# Patient Record
Sex: MALE | Race: WHITE | Employment: STUDENT | URBAN - METROPOLITAN AREA
[De-identification: names, ages, dates, MRNs, and addresses within clinical notes are randomized per-mention and may not be internally consistent; named-entity substitution may affect disease eponyms.]

---

## 2021-12-11 ENCOUNTER — APPOINTMENT (OUTPATIENT)
Dept: CT IMAGING | Facility: HOSPITAL | Age: 21
End: 2021-12-11
Attending: EMERGENCY MEDICINE

## 2021-12-11 ENCOUNTER — HOSPITAL ENCOUNTER (EMERGENCY)
Facility: HOSPITAL | Age: 21
Discharge: HOME | End: 2021-12-11
Attending: EMERGENCY MEDICINE

## 2021-12-11 VITALS
RESPIRATION RATE: 18 BRPM | OXYGEN SATURATION: 99 % | WEIGHT: 180 LBS | DIASTOLIC BLOOD PRESSURE: 71 MMHG | HEART RATE: 77 BPM | BODY MASS INDEX: 22.62 KG/M2 | TEMPERATURE: 99 F | HEIGHT: 74.8 IN | SYSTOLIC BLOOD PRESSURE: 126 MMHG

## 2021-12-11 DIAGNOSIS — S09.90XA INJURY OF HEAD, INITIAL ENCOUNTER: Primary | ICD-10-CM

## 2021-12-11 DIAGNOSIS — S06.0X0A CONCUSSION WITHOUT LOSS OF CONSCIOUSNESS, INITIAL ENCOUNTER: ICD-10-CM

## 2021-12-11 PROCEDURE — 70450 CT HEAD/BRAIN W/O DYE: CPT | Performed by: EMERGENCY MEDICINE

## 2021-12-11 PROCEDURE — 99284 EMERGENCY DEPT VISIT MOD MDM: CPT | Performed by: EMERGENCY MEDICINE

## 2021-12-12 NOTE — ED PROVIDER NOTES
Patient Seen in: [facility] Emergency Department      History   Patient presents with:  Head Neck Injury    Stated Complaint: fell and hit head on ground, no LOC    Subjective:   HPI    26-year-old male presents to the emergency department for evalu Pulmonary:      Effort: Pulmonary effort is normal.      Breath sounds: Normal breath sounds. No stridor. Abdominal:      General: Bowel sounds are normal.      Palpations: Abdomen is soft. Musculoskeletal:         General: Normal range of motion. Disposition and Plan     Clinical Impression:  Injury of head, initial encounter  (primary encounter diagnosis)  Concussion without loss of consciousness, initial encounter     Disposition:  Discharge  12/11/2021  9:59 pm    Follow-up:  Sophie Tran

## 2021-12-12 NOTE — ED INITIAL ASSESSMENT (HPI)
Mechanical fall and hit the back of his head 1 hour prior to arrival. Patient having occipital headache since the fall. Denies LOC, blood thinners, neck, back pain. History of concussions.